# Patient Record
(demographics unavailable — no encounter records)

---

## 2017-06-04 NOTE — ED.ADGEN
Past History


Past Medical History:  No Pertinent History


Past Surgical History:  No Surgical History





Adult General


Chief Complaint


Chief Complaint


Rash





HPI


HPI





Patient is a 9 month old male who presents with a rash on his hands. Mom states 

that the patient is a 32 weeker, never been hospitalized after he was 

discharged. On no medications has no allergies. He's been eating and drinking 

and acting fine. He did tug at his right ear yesterday couple times however not 

been acting sick or having a fever. Today she noticed a rash on his right hand 

and is concerned it could be poison ivy. She states the family is allergic to 

poison ivy and she wants to make sure that it treated early if its that.





Review of Systems


Review of Systems





Constitutional: Denies fever or chills []


Eyes: Denies change in visual acuity, redness, or eye pain []


HENT: Denies nasal congestion or sore throat []


Respiratory: Denies cough or shortness of breath []


Cardiovascular: No additional information not addressed in HPI []


GI: Denies abdominal pain, nausea, vomiting, bloody stools or diarrhea []


: Denies dysuria or hematuria []


Musculoskeletal: Denies back pain or joint pain []


Integument: Positive for rash


Neurologic: Denies headache, focal weakness or sensory changes []


Endocrine: Denies polyuria or polydipsia []





Physical Exam


Physical Exam





Constitutional: Well developed, well nourished, no acute distress, non-toxic 

appearance. []


HENT: Normocephalic, atraumatic, bilateral external ears normal, left TM 

slightly erythematous and partially obscured by cerumen, right TM slightly 

erythematous, nose normal. []


Eyes: PERRLA, EOMI, conjunctiva normal, no discharge. [] 


Neck: Normal range of motion, no tenderness, supple, no stridor. [] 


Cardiovascular:Heart rate regular rhythm, no murmur []


Lungs & Thorax:  Bilateral breath sounds clear to auscultation []


Abdomen: Bowel sounds normal, soft, no tenderness, no masses, no pulsatile 

masses. [] 


Skin: Warm, dry, no erythema, 7-8. Small vesicular type lesions on the right 

forearm, no erythema [] 


Back: No tenderness, no CVA tenderness. [] 


Extremities: No tenderness, no cyanosis, no clubbing, ROM intact, no edema. [] 


Neurologic: Alert and oriented X 3, normal motor function, normal sensory 

function, no focal deficits noted. []


Psychologic: Affect normal, judgement normal, mood normal. []





Current Patient Data


Vital Signs





 Vital Signs








  Date Time  Temp Pulse Resp B/P (MAP) Pulse Ox O2 Delivery O2 Flow Rate FiO2


 


6/4/17 17:56 97.7    97   











EKG


EKG


[]





Radiology/Procedures


Radiology/Procedures


[]





Course & Med Decision Making


Course & Med Decision Making


Pertinent Labs and Imaging studies reviewed. (See chart for details)





Patient has a rash on his right forearm and mild injections of his ears his 

right slightly worsens (doesn't have a fever is not acting sick. And a long 

conversation with mom and  spoke with her in addition regarding viral 

syndromes and these antibodies. Mom at this time with a hold off on antibiotics 

and see their pediatrician tomorrow. Return precautions given mom's agreeable 

to plan to be discharged in stable condition and instructed return back to ER 

if they have any fevers, child starts acting sick with his any other concerns.





Final Impression


Final Impression


Rash


Problems:  





Dragon Disclaimer


Dragon Disclaimer


This electronic medical record was generated, in whole or in part, using a 

voice recognition dictation system.











JOHN WHALEN MD Jun 4, 2017 18:15